# Patient Record
Sex: FEMALE | Race: WHITE | NOT HISPANIC OR LATINO
[De-identification: names, ages, dates, MRNs, and addresses within clinical notes are randomized per-mention and may not be internally consistent; named-entity substitution may affect disease eponyms.]

---

## 2018-08-02 PROBLEM — Z00.00 ENCOUNTER FOR PREVENTIVE HEALTH EXAMINATION: Status: ACTIVE | Noted: 2018-08-02

## 2018-08-06 ENCOUNTER — APPOINTMENT (OUTPATIENT)
Dept: THORACIC SURGERY | Facility: CLINIC | Age: 73
End: 2018-08-06
Payer: COMMERCIAL

## 2018-08-06 VITALS
RESPIRATION RATE: 16 BRPM | BODY MASS INDEX: 47.09 KG/M2 | HEIGHT: 66 IN | SYSTOLIC BLOOD PRESSURE: 133 MMHG | WEIGHT: 293 LBS | DIASTOLIC BLOOD PRESSURE: 74 MMHG | OXYGEN SATURATION: 97 % | HEART RATE: 90 BPM

## 2018-08-06 DIAGNOSIS — F17.200 NICOTINE DEPENDENCE, UNSPECIFIED, UNCOMPLICATED: ICD-10-CM

## 2018-08-06 DIAGNOSIS — Z86.39 PERSONAL HISTORY OF OTHER ENDOCRINE, NUTRITIONAL AND METABOLIC DISEASE: ICD-10-CM

## 2018-08-06 DIAGNOSIS — Z86.79 PERSONAL HISTORY OF OTHER DISEASES OF THE CIRCULATORY SYSTEM: ICD-10-CM

## 2018-08-06 DIAGNOSIS — Z87.09 PERSONAL HISTORY OF OTHER DISEASES OF THE RESPIRATORY SYSTEM: ICD-10-CM

## 2018-08-06 DIAGNOSIS — R91.8 OTHER NONSPECIFIC ABNORMAL FINDING OF LUNG FIELD: ICD-10-CM

## 2018-08-06 PROCEDURE — 99205 OFFICE O/P NEW HI 60 MIN: CPT

## 2018-08-06 RX ORDER — SITAGLIPTIN 100 MG/1
100 TABLET, FILM COATED ORAL
Refills: 0 | Status: ACTIVE | COMMUNITY

## 2018-08-06 RX ORDER — IRBESARTAN 150 MG/1
150 TABLET ORAL
Refills: 0 | Status: ACTIVE | COMMUNITY

## 2018-08-06 RX ORDER — RAMIPRIL 2.5 MG/1
2.5 CAPSULE ORAL
Refills: 0 | Status: ACTIVE | COMMUNITY

## 2018-08-06 RX ORDER — GLIMEPIRIDE 4 MG/1
4 TABLET ORAL
Refills: 0 | Status: ACTIVE | COMMUNITY

## 2018-08-06 RX ORDER — CARVEDILOL 3.12 MG/1
3.12 TABLET, FILM COATED ORAL
Refills: 0 | Status: ACTIVE | COMMUNITY

## 2018-10-11 ENCOUNTER — OUTPATIENT (OUTPATIENT)
Dept: OUTPATIENT SERVICES | Facility: HOSPITAL | Age: 73
LOS: 1 days | End: 2018-10-11
Payer: COMMERCIAL

## 2018-10-11 VITALS
TEMPERATURE: 99 F | DIASTOLIC BLOOD PRESSURE: 90 MMHG | HEART RATE: 88 BPM | SYSTOLIC BLOOD PRESSURE: 182 MMHG | HEIGHT: 68 IN | RESPIRATION RATE: 16 BRPM | WEIGHT: 231.49 LBS

## 2018-10-11 DIAGNOSIS — E11.9 TYPE 2 DIABETES MELLITUS WITHOUT COMPLICATIONS: ICD-10-CM

## 2018-10-11 DIAGNOSIS — Z01.818 ENCOUNTER FOR OTHER PREPROCEDURAL EXAMINATION: ICD-10-CM

## 2018-10-11 DIAGNOSIS — Z98.890 OTHER SPECIFIED POSTPROCEDURAL STATES: Chronic | ICD-10-CM

## 2018-10-11 DIAGNOSIS — Z96.653 PRESENCE OF ARTIFICIAL KNEE JOINT, BILATERAL: Chronic | ICD-10-CM

## 2018-10-11 DIAGNOSIS — R59.9 ENLARGED LYMPH NODES, UNSPECIFIED: ICD-10-CM

## 2018-10-11 DIAGNOSIS — Z29.9 ENCOUNTER FOR PROPHYLACTIC MEASURES, UNSPECIFIED: ICD-10-CM

## 2018-10-11 LAB
ANION GAP SERPL CALC-SCNC: 13 MMOL/L — SIGNIFICANT CHANGE UP (ref 5–17)
APTT BLD: 33.3 SEC — SIGNIFICANT CHANGE UP (ref 27.5–37.4)
BUN SERPL-MCNC: 13 MG/DL — SIGNIFICANT CHANGE UP (ref 8–20)
CALCIUM SERPL-MCNC: 9.9 MG/DL — SIGNIFICANT CHANGE UP (ref 8.6–10.2)
CHLORIDE SERPL-SCNC: 94 MMOL/L — LOW (ref 98–107)
CO2 SERPL-SCNC: 22 MMOL/L — SIGNIFICANT CHANGE UP (ref 22–29)
CREAT SERPL-MCNC: 0.64 MG/DL — SIGNIFICANT CHANGE UP (ref 0.5–1.3)
GLUCOSE SERPL-MCNC: 103 MG/DL — SIGNIFICANT CHANGE UP (ref 70–115)
HCT VFR BLD CALC: 40.1 % — SIGNIFICANT CHANGE UP (ref 37–47)
HGB BLD-MCNC: 13.3 G/DL — SIGNIFICANT CHANGE UP (ref 12–16)
INR BLD: 1.15 RATIO — SIGNIFICANT CHANGE UP (ref 0.88–1.16)
MCHC RBC-ENTMCNC: 30.1 PG — SIGNIFICANT CHANGE UP (ref 27–31)
MCHC RBC-ENTMCNC: 33.2 G/DL — SIGNIFICANT CHANGE UP (ref 32–36)
MCV RBC AUTO: 90.7 FL — SIGNIFICANT CHANGE UP (ref 81–99)
PLATELET # BLD AUTO: 209 K/UL — SIGNIFICANT CHANGE UP (ref 150–400)
POTASSIUM SERPL-MCNC: 4.5 MMOL/L — SIGNIFICANT CHANGE UP (ref 3.5–5.3)
POTASSIUM SERPL-SCNC: 4.5 MMOL/L — SIGNIFICANT CHANGE UP (ref 3.5–5.3)
PROTHROM AB SERPL-ACNC: 12.7 SEC — SIGNIFICANT CHANGE UP (ref 9.8–12.7)
RBC # BLD: 4.42 M/UL — SIGNIFICANT CHANGE UP (ref 4.4–5.2)
RBC # FLD: 13.9 % — SIGNIFICANT CHANGE UP (ref 11–15.6)
SODIUM SERPL-SCNC: 129 MMOL/L — LOW (ref 135–145)
WBC # BLD: 8 K/UL — SIGNIFICANT CHANGE UP (ref 4.8–10.8)
WBC # FLD AUTO: 8 K/UL — SIGNIFICANT CHANGE UP (ref 4.8–10.8)

## 2018-10-11 PROCEDURE — 36415 COLL VENOUS BLD VENIPUNCTURE: CPT

## 2018-10-11 PROCEDURE — 85610 PROTHROMBIN TIME: CPT

## 2018-10-11 PROCEDURE — 85730 THROMBOPLASTIN TIME PARTIAL: CPT

## 2018-10-11 PROCEDURE — G0463: CPT

## 2018-10-11 PROCEDURE — 85027 COMPLETE CBC AUTOMATED: CPT

## 2018-10-11 PROCEDURE — 80048 BASIC METABOLIC PNL TOTAL CA: CPT

## 2018-10-11 NOTE — H&P PST ADULT - NS NEC GEN PE MLT EXAM PC
I tentatively put her in the 5:20 because she should be seen. I am at [facility] today, not [place].  Could someone bring her? Perhaps a family member. It is not that bad out. Just a dusting of snow. detailed exam

## 2018-10-11 NOTE — H&P PST ADULT - PSH
H/O carpal tunnel repair  right  H/O foot surgery  with hard ware  H/O total knee replacement, bilateral    History of back surgery  with hard ware

## 2018-10-11 NOTE — H&P PST ADULT - HISTORY OF PRESENT ILLNESS
73 year old female 73 year old female who states that she noticed a mass on the left side of her neck 2 months ago, no complains of pain 73 year old female who states that she noticed a mass on the left side of her neck 2 months ago, no complains of pain now scheduled for a CT guided lymph node biopsy

## 2018-10-11 NOTE — H&P PST ADULT - ASSESSMENT
medications reviewed, instructions given on what medications to take and what not to take.   CAPRINI SCORE [CLOT]    AGE RELATED RISK FACTORS                                                       MOBILITY RELATED FACTORS  [ ] Age 41-60 years                                            (1 Point)                  [ ] Bed rest                                                        (1 Point)  [ ] Age: 61-74 years                                           (2 Points)                 [ ] Plaster cast                                                   (2 Points)  [ ] Age= 75 years                                              (3 Points)                 [ ] Bed bound for more than 72 hours                 (2 Points)    DISEASE RELATED RISK FACTORS                                               GENDER SPECIFIC FACTORS  [ ] Edema in the lower extremities                       (1 Point)                  [ ] Pregnancy                                                     (1 Point)  [ ] Varicose veins                                               (1 Point)                  [ ] Post-partum < 6 weeks                                   (1 Point)             [ ] BMI > 25 Kg/m2                                            (1 Point)                  [ ] Hormonal therapy  or oral contraception          (1 Point)                 [ ] Sepsis (in the previous month)                        (1 Point)                  [ ] History of pregnancy complications                 (1 point)  [ ] Pneumonia or serious lung disease                                               [ ] Unexplained or recurrent                     (1 Point)           (in the previous month)                               (1 Point)  [ ] Abnormal pulmonary function test                     (1 Point)                 SURGERY RELATED RISK FACTORS  [ ] Acute myocardial infarction                              (1 Point)                 [ ]  Section                                             (1 Point)  [ ] Congestive heart failure (in the previous month)  (1 Point)               [ ] Minor surgery                                                  (1 Point)   [ ] Inflammatory bowel disease                             (1 Point)                 [ ] Arthroscopic surgery                                        (2 Points)  [ ] Central venous access                                      (2 Points)                [ ] General surgery lasting more than 45 minutes   (2 Points)       [ ] Stroke (in the previous month)                          (5 Points)               [ ] Elective arthroplasty                                         (5 Points)                                                                                                                                               HEMATOLOGY RELATED FACTORS                                                 TRAUMA RELATED RISK FACTORS  [ ] Prior episodes of VTE                                     (3 Points)                 [ ] Fracture of the hip, pelvis, or leg                       (5 Points)  [ ] Positive family history for VTE                         (3 Points)                 [ ] Acute spinal cord injury (in the previous month)  (5 Points)  [ ] Prothrombin 74191 A                                     (3 Points)                 [ ] Paralysis  (less than 1 month)                             (5 Points)  [ ] Factor V Leiden                                             (3 Points)                  [ ] Multiple Trauma within 1 month                        (5 Points)  [ ] Lupus anticoagulants                                     (3 Points)                                                           [ ] Anticardiolipin antibodies                               (3 Points)                                                       [ ] High homocysteine in the blood                      (3 Points)                                             [ ] Other congenital or acquired thrombophilia      (3 Points)                                                [ ] Heparin induced thrombocytopenia                  (3 Points)                                          Total Score [          ] medications reviewed, instructions given on what medications to take and what not to take.   CAPRINI SCORE [CLOT]    AGE RELATED RISK FACTORS                                                       MOBILITY RELATED FACTORS  [ ] Age 41-60 years                                            (1 Point)                  [ ] Bed rest                                                        (1 Point)  [x ] Age: 61-74 years                                           (2 Points)                 [ ] Plaster cast                                                   (2 Points)  [ ] Age= 75 years                                              (3 Points)                 [ ] Bed bound for more than 72 hours                 (2 Points)    DISEASE RELATED RISK FACTORS                                               GENDER SPECIFIC FACTORS  [x ] Edema in the lower extremities                       (1 Point)                  [ ] Pregnancy                                                     (1 Point)  [ ] Varicose veins                                               (1 Point)                  [ ] Post-partum < 6 weeks                                   (1 Point)             x[ ] BMI > 25 Kg/m2                                            (1 Point)                  [ ] Hormonal therapy  or oral contraception          (1 Point)                 [ ] Sepsis (in the previous month)                        (1 Point)                  [ ] History of pregnancy complications                 (1 point)  [ ] Pneumonia or serious lung disease                                               [ ] Unexplained or recurrent                     (1 Point)           (in the previous month)                               (1 Point)  [ ] Abnormal pulmonary function test                     (1 Point)                 SURGERY RELATED RISK FACTORS  [ ] Acute myocardial infarction                              (1 Point)                 [ ]  Section                                             (1 Point)  [ ] Congestive heart failure (in the previous month)  (1 Point)               [x ] Minor surgery                                                  (1 Point)   [ ] Inflammatory bowel disease                             (1 Point)                 [ ] Arthroscopic surgery                                        (2 Points)  [ ] Central venous access                                      (2 Points)                [ ] General surgery lasting more than 45 minutes   (2 Points)       [ ] Stroke (in the previous month)                          (5 Points)               [ ] Elective arthroplasty                                         (5 Points)                                                                                                                                               HEMATOLOGY RELATED FACTORS                                                 TRAUMA RELATED RISK FACTORS  [ ] Prior episodes of VTE                                     (3 Points)                 [ ] Fracture of the hip, pelvis, or leg                       (5 Points)  [ ] Positive family history for VTE                         (3 Points)                 [ ] Acute spinal cord injury (in the previous month)  (5 Points)  [ ] Prothrombin 97025 A                                     (3 Points)                 [ ] Paralysis  (less than 1 month)                             (5 Points)  [ ] Factor V Leiden                                             (3 Points)                  [ ] Multiple Trauma within 1 month                        (5 Points)  [ ] Lupus anticoagulants                                     (3 Points)                                                           [ ] Anticardiolipin antibodies                               (3 Points)                                                       [ ] High homocysteine in the blood                      (3 Points)                                             [ ] Other congenital or acquired thrombophilia      (3 Points)                                                [ ] Heparin induced thrombocytopenia                  (3 Points)                                          Total Score [5          ]

## 2018-10-11 NOTE — H&P PST ADULT - NSANTHOSAYNRD_GEN_A_CORE
No. JULIANO screening performed.  STOP BANG Legend: 0-2 = LOW Risk; 3-4 = INTERMEDIATE Risk; 5-8 = HIGH Risk

## 2018-10-11 NOTE — H&P PST ADULT - SKIN COMMENTS
5/24/2017     RE: Eran Almeida  [address]  [facility] [address]     Dear Colleague,    Thank you for referring your patient, Eran Almeida, to the [facility] EAR NOSE AND THROAT at [facility]. Please see a copy of my visit note below.        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  He has been working with Shannan Francisco and has had significant improvement with the treatment with her.    He is quite pleased.  He has had some recovery of function.  The EMG demonstrates that he has multiple polyphasic action potentials, but does show moderate injury to the facial nerve.      On evaluation today, he has a facial examination that demonstrates a right-sided injury.  He has no brow elevation.  He has full eye closure, but in fact the palpebral aperture is more narrowed on the right compared to the left.  This is the affected side.  He has ocular-oral synkinesis.  When he speaks, he has significant tightening of the right eye.  When he tries to smile, he has complete closure of the right eye.  This synkinesis limits his view and function due to the visual limitation.  He has an eyelid weight in the right upper eyelid.  He has mentalis hypercontracture.  He has over activity of the left depressor.   He is also quite expressive with his brows, and the asymmetry is quite notable with hyperfunction of the left side.  Otherwise, head and neck examination is otherwise benign.           Procedure: Chemodenervation with Botulinum Toxin A  Indication: Hemifacial Spasm and Hypercontracture  Injector: Dr. Kaci Trevino    The skin was cleaned with antimicrobial solution and a topical ice mask was placed.   The patient was asked to systematically engage the muscles in the area to be injected. The tuberculin needles were used for subdermal  injection and hemostasis was obtained with digital pressure when needed. The skin was cleaned.     A total of 15 units was injected subdermally. Areas treated were : right periorbital region, right mentalis, left depressor, left brow  Please see scanned procedure log.    The patient tolerated the procedure well and there were no complications.      Omega presents today with recovery of the facial nerve from his injury; however, this has been delayed and incomplete.  All branches have been re-innervated.  Frontalis is not notable clinically, but is noted on EMG.  He, however, has now suffered the sequellae of recovery which include hemifacial spasm, tightness and synkinesis.  This is primarily notable with the closure of the eye when he moves his lower division of his face.  He would benefit from chemodenervation with botulinum toxin.  I discussed the risks and benefits of the procedure with him at length today.      He is interested in proceeding with the procedure and we performed the procedure today.  Please see the above procedure note.      The patient has discussed that Dr. Collins is considering removal of the platinum chain.  I actually think it will be beneficial to have the Botox on board and working to note what the true dependence is on that chain.   I would defer management of the platinum weights to Dr. Collins.     I spent a total of 30 minutes face-to-face with Eran Almeida during today's office visit.  Over 50% of this time was spent counseling the patient and/or coordinating care regarding his facial paralysis, reviewing the EMG, and performing the procedure.  See note for details.    Again, thank you for allowing me to participate in the care of your patient.      Sincerely,    Kaci Trevino MD     noted a intact dressing to left great toe/

## 2018-10-17 ENCOUNTER — INPATIENT (INPATIENT)
Facility: HOSPITAL | Age: 73
LOS: 0 days | Discharge: LEFT BEFORE DISCHARGE | DRG: 168 | End: 2018-10-18
Attending: FAMILY MEDICINE
Payer: COMMERCIAL

## 2018-10-17 VITALS
RESPIRATION RATE: 26 BRPM | OXYGEN SATURATION: 94 % | HEART RATE: 91 BPM | HEIGHT: 68 IN | WEIGHT: 233.03 LBS | DIASTOLIC BLOOD PRESSURE: 95 MMHG | SYSTOLIC BLOOD PRESSURE: 140 MMHG

## 2018-10-17 DIAGNOSIS — Z98.890 OTHER SPECIFIED POSTPROCEDURAL STATES: Chronic | ICD-10-CM

## 2018-10-17 DIAGNOSIS — E11.9 TYPE 2 DIABETES MELLITUS WITHOUT COMPLICATIONS: ICD-10-CM

## 2018-10-17 DIAGNOSIS — Z96.653 PRESENCE OF ARTIFICIAL KNEE JOINT, BILATERAL: Chronic | ICD-10-CM

## 2018-10-17 DIAGNOSIS — J18.9 PNEUMONIA, UNSPECIFIED ORGANISM: ICD-10-CM

## 2018-10-17 DIAGNOSIS — R05 COUGH: ICD-10-CM

## 2018-10-17 DIAGNOSIS — M54.9 DORSALGIA, UNSPECIFIED: ICD-10-CM

## 2018-10-17 DIAGNOSIS — I10 ESSENTIAL (PRIMARY) HYPERTENSION: ICD-10-CM

## 2018-10-17 LAB
ALBUMIN SERPL ELPH-MCNC: 4.5 G/DL — SIGNIFICANT CHANGE UP (ref 3.3–5.2)
ALP SERPL-CCNC: 66 U/L — SIGNIFICANT CHANGE UP (ref 40–120)
ALT FLD-CCNC: 11 U/L — SIGNIFICANT CHANGE UP
ANION GAP SERPL CALC-SCNC: 13 MMOL/L — SIGNIFICANT CHANGE UP (ref 5–17)
APTT BLD: 38.8 SEC — HIGH (ref 27.5–37.4)
AST SERPL-CCNC: 17 U/L — SIGNIFICANT CHANGE UP
BASOPHILS # BLD AUTO: 0 K/UL — SIGNIFICANT CHANGE UP (ref 0–0.2)
BASOPHILS NFR BLD AUTO: 0.6 % — SIGNIFICANT CHANGE UP (ref 0–2)
BILIRUB SERPL-MCNC: 0.4 MG/DL — SIGNIFICANT CHANGE UP (ref 0.4–2)
BUN SERPL-MCNC: 11 MG/DL — SIGNIFICANT CHANGE UP (ref 8–20)
CALCIUM SERPL-MCNC: 9.8 MG/DL — SIGNIFICANT CHANGE UP (ref 8.6–10.2)
CHLORIDE SERPL-SCNC: 97 MMOL/L — LOW (ref 98–107)
CO2 SERPL-SCNC: 23 MMOL/L — SIGNIFICANT CHANGE UP (ref 22–29)
CREAT SERPL-MCNC: 0.63 MG/DL — SIGNIFICANT CHANGE UP (ref 0.5–1.3)
EOSINOPHIL # BLD AUTO: 0.7 K/UL — HIGH (ref 0–0.5)
EOSINOPHIL NFR BLD AUTO: 8.6 % — HIGH (ref 0–6)
GLUCOSE SERPL-MCNC: 103 MG/DL — SIGNIFICANT CHANGE UP (ref 70–115)
HCT VFR BLD CALC: 40.2 % — SIGNIFICANT CHANGE UP (ref 37–47)
HGB BLD-MCNC: 13.1 G/DL — SIGNIFICANT CHANGE UP (ref 12–16)
INR BLD: 1.16 RATIO — SIGNIFICANT CHANGE UP (ref 0.88–1.16)
LYMPHOCYTES # BLD AUTO: 0.9 K/UL — LOW (ref 1–4.8)
LYMPHOCYTES # BLD AUTO: 11.2 % — LOW (ref 20–55)
MCHC RBC-ENTMCNC: 29.9 PG — SIGNIFICANT CHANGE UP (ref 27–31)
MCHC RBC-ENTMCNC: 32.6 G/DL — SIGNIFICANT CHANGE UP (ref 32–36)
MCV RBC AUTO: 91.8 FL — SIGNIFICANT CHANGE UP (ref 81–99)
MONOCYTES # BLD AUTO: 0.9 K/UL — HIGH (ref 0–0.8)
MONOCYTES NFR BLD AUTO: 11.9 % — HIGH (ref 3–10)
NEUTROPHILS # BLD AUTO: 5.3 K/UL — SIGNIFICANT CHANGE UP (ref 1.8–8)
NEUTROPHILS NFR BLD AUTO: 67.3 % — SIGNIFICANT CHANGE UP (ref 37–73)
NT-PROBNP SERPL-SCNC: 459 PG/ML — HIGH (ref 0–300)
PLATELET # BLD AUTO: 184 K/UL — SIGNIFICANT CHANGE UP (ref 150–400)
POTASSIUM SERPL-MCNC: 4.5 MMOL/L — SIGNIFICANT CHANGE UP (ref 3.5–5.3)
POTASSIUM SERPL-SCNC: 4.5 MMOL/L — SIGNIFICANT CHANGE UP (ref 3.5–5.3)
PROT SERPL-MCNC: 7.5 G/DL — SIGNIFICANT CHANGE UP (ref 6.6–8.7)
PROTHROM AB SERPL-ACNC: 12.8 SEC — HIGH (ref 9.8–12.7)
RAPID RVP RESULT: SIGNIFICANT CHANGE UP
RBC # BLD: 4.38 M/UL — LOW (ref 4.4–5.2)
RBC # FLD: 13.6 % — SIGNIFICANT CHANGE UP (ref 11–15.6)
SODIUM SERPL-SCNC: 133 MMOL/L — LOW (ref 135–145)
WBC # BLD: 7.8 K/UL — SIGNIFICANT CHANGE UP (ref 4.8–10.8)
WBC # FLD AUTO: 7.8 K/UL — SIGNIFICANT CHANGE UP (ref 4.8–10.8)

## 2018-10-17 PROCEDURE — 93010 ELECTROCARDIOGRAM REPORT: CPT

## 2018-10-17 PROCEDURE — 99223 1ST HOSP IP/OBS HIGH 75: CPT

## 2018-10-17 PROCEDURE — 99285 EMERGENCY DEPT VISIT HI MDM: CPT

## 2018-10-17 PROCEDURE — 71045 X-RAY EXAM CHEST 1 VIEW: CPT | Mod: 26

## 2018-10-17 RX ORDER — INSULIN LISPRO 100/ML
VIAL (ML) SUBCUTANEOUS
Qty: 0 | Refills: 0 | Status: DISCONTINUED | OUTPATIENT
Start: 2018-10-17 | End: 2018-10-18

## 2018-10-17 RX ORDER — ALBUTEROL 90 UG/1
2.5 AEROSOL, METERED ORAL EVERY 6 HOURS
Qty: 0 | Refills: 0 | Status: DISCONTINUED | OUTPATIENT
Start: 2018-10-17 | End: 2018-10-18

## 2018-10-17 RX ORDER — DEXTROSE 50 % IN WATER 50 %
25 SYRINGE (ML) INTRAVENOUS ONCE
Qty: 0 | Refills: 0 | Status: DISCONTINUED | OUTPATIENT
Start: 2018-10-17 | End: 2018-10-18

## 2018-10-17 RX ORDER — CARVEDILOL PHOSPHATE 80 MG/1
3.12 CAPSULE, EXTENDED RELEASE ORAL EVERY 12 HOURS
Qty: 0 | Refills: 0 | Status: DISCONTINUED | OUTPATIENT
Start: 2018-10-17 | End: 2018-10-18

## 2018-10-17 RX ORDER — GLUCAGON INJECTION, SOLUTION 0.5 MG/.1ML
1 INJECTION, SOLUTION SUBCUTANEOUS ONCE
Qty: 0 | Refills: 0 | Status: DISCONTINUED | OUTPATIENT
Start: 2018-10-17 | End: 2018-10-18

## 2018-10-17 RX ORDER — ALBUTEROL 90 UG/1
2.5 AEROSOL, METERED ORAL
Qty: 0 | Refills: 0 | Status: COMPLETED | OUTPATIENT
Start: 2018-10-17 | End: 2018-10-17

## 2018-10-17 RX ORDER — DEXTROSE 50 % IN WATER 50 %
15 SYRINGE (ML) INTRAVENOUS ONCE
Qty: 0 | Refills: 0 | Status: DISCONTINUED | OUTPATIENT
Start: 2018-10-17 | End: 2018-10-18

## 2018-10-17 RX ORDER — LISINOPRIL 2.5 MG/1
10 TABLET ORAL DAILY
Qty: 0 | Refills: 0 | Status: DISCONTINUED | OUTPATIENT
Start: 2018-10-17 | End: 2018-10-18

## 2018-10-17 RX ORDER — HEPARIN SODIUM 5000 [USP'U]/ML
5000 INJECTION INTRAVENOUS; SUBCUTANEOUS EVERY 12 HOURS
Qty: 0 | Refills: 0 | Status: DISCONTINUED | OUTPATIENT
Start: 2018-10-17 | End: 2018-10-18

## 2018-10-17 RX ORDER — CYCLOBENZAPRINE HYDROCHLORIDE 10 MG/1
10 TABLET, FILM COATED ORAL THREE TIMES A DAY
Qty: 0 | Refills: 0 | Status: DISCONTINUED | OUTPATIENT
Start: 2018-10-17 | End: 2018-10-18

## 2018-10-17 RX ORDER — CEFTRIAXONE 500 MG/1
1 INJECTION, POWDER, FOR SOLUTION INTRAMUSCULAR; INTRAVENOUS ONCE
Qty: 0 | Refills: 0 | Status: COMPLETED | OUTPATIENT
Start: 2018-10-17 | End: 2018-10-17

## 2018-10-17 RX ORDER — SODIUM CHLORIDE 9 MG/ML
1000 INJECTION, SOLUTION INTRAVENOUS
Qty: 0 | Refills: 0 | Status: DISCONTINUED | OUTPATIENT
Start: 2018-10-17 | End: 2018-10-18

## 2018-10-17 RX ORDER — IRBESARTAN 75 MG/1
1 TABLET ORAL
Qty: 0 | Refills: 0 | COMMUNITY

## 2018-10-17 RX ORDER — OXYCODONE AND ACETAMINOPHEN 5; 325 MG/1; MG/1
1 TABLET ORAL EVERY 6 HOURS
Qty: 0 | Refills: 0 | Status: DISCONTINUED | OUTPATIENT
Start: 2018-10-17 | End: 2018-10-18

## 2018-10-17 RX ORDER — IPRATROPIUM/ALBUTEROL SULFATE 18-103MCG
3 AEROSOL WITH ADAPTER (GRAM) INHALATION
Qty: 0 | Refills: 0 | Status: COMPLETED | OUTPATIENT
Start: 2018-10-17 | End: 2018-10-17

## 2018-10-17 RX ORDER — CYCLOBENZAPRINE HYDROCHLORIDE 10 MG/1
1 TABLET, FILM COATED ORAL
Qty: 0 | Refills: 0 | COMMUNITY

## 2018-10-17 RX ORDER — LOSARTAN POTASSIUM 100 MG/1
50 TABLET, FILM COATED ORAL DAILY
Qty: 0 | Refills: 0 | Status: DISCONTINUED | OUTPATIENT
Start: 2018-10-17 | End: 2018-10-18

## 2018-10-17 RX ORDER — RAMIPRIL 5 MG
1 CAPSULE ORAL
Qty: 0 | Refills: 0 | COMMUNITY

## 2018-10-17 RX ORDER — AZITHROMYCIN 500 MG/1
500 TABLET, FILM COATED ORAL ONCE
Qty: 0 | Refills: 0 | Status: COMPLETED | OUTPATIENT
Start: 2018-10-17 | End: 2018-10-17

## 2018-10-17 RX ORDER — DEXTROSE 50 % IN WATER 50 %
12.5 SYRINGE (ML) INTRAVENOUS ONCE
Qty: 0 | Refills: 0 | Status: DISCONTINUED | OUTPATIENT
Start: 2018-10-17 | End: 2018-10-18

## 2018-10-17 RX ORDER — GLIMEPIRIDE 1 MG
1 TABLET ORAL
Qty: 0 | Refills: 0 | COMMUNITY

## 2018-10-17 RX ORDER — CARVEDILOL PHOSPHATE 80 MG/1
1 CAPSULE, EXTENDED RELEASE ORAL
Qty: 0 | Refills: 0 | COMMUNITY

## 2018-10-17 RX ADMIN — Medication 3 MILLILITER(S): at 16:51

## 2018-10-17 RX ADMIN — ALBUTEROL 2.5 MILLIGRAM(S): 90 AEROSOL, METERED ORAL at 20:00

## 2018-10-17 RX ADMIN — Medication 125 MILLIGRAM(S): at 17:22

## 2018-10-17 RX ADMIN — CEFTRIAXONE 100 GRAM(S): 500 INJECTION, POWDER, FOR SOLUTION INTRAMUSCULAR; INTRAVENOUS at 17:22

## 2018-10-17 RX ADMIN — ALBUTEROL 2.5 MILLIGRAM(S): 90 AEROSOL, METERED ORAL at 03:32

## 2018-10-17 RX ADMIN — ALBUTEROL 2.5 MILLIGRAM(S): 90 AEROSOL, METERED ORAL at 17:52

## 2018-10-17 RX ADMIN — CEFTRIAXONE 1 GRAM(S): 500 INJECTION, POWDER, FOR SOLUTION INTRAMUSCULAR; INTRAVENOUS at 18:00

## 2018-10-17 RX ADMIN — AZITHROMYCIN 255 MILLIGRAM(S): 500 TABLET, FILM COATED ORAL at 19:11

## 2018-10-17 RX ADMIN — Medication 3 MILLILITER(S): at 17:12

## 2018-10-17 RX ADMIN — Medication 3 MILLILITER(S): at 17:52

## 2018-10-17 NOTE — ED PROVIDER NOTE - MEDICAL DECISION MAKING DETAILS
The patient came from IR for cough and respiratory distress unable to perform biopsy.  Will admit for further evaluation

## 2018-10-17 NOTE — ED ADULT NURSE NOTE - NSIMPLEMENTINTERV_GEN_ALL_ED
Implemented All Universal Safety Interventions:  [place] to call system. Call bell, personal items and telephone within reach. Instruct patient to call for assistance. Room bathroom lighting operational. Non-slip footwear when patient is off stretcher. Physically safe environment: no spills, clutter or unnecessary equipment. Stretcher in lowest position, wheels locked, appropriate side rails in place.

## 2018-10-17 NOTE — H&P ADULT - PROBLEM SELECTOR PLAN 3
controlled.  on ramipril 2.5 mg -->lisinopril 10 mg, Irbesartan 150 mg-->cozaar 50.  continue coreg 12.5 mg bid.

## 2018-10-17 NOTE — H&P ADULT - PROBLEM SELECTOR PLAN 1
?etiology.  CT chest done at an outside facility concerning for lung CA  s/p unsuccessful attempt to biopsy L neck mass.  patient has been following with CT chest surgery and has been notified by ER.  will obtain pulmonary consult.

## 2018-10-17 NOTE — H&P ADULT - ASSESSMENT
73 years old female with PMH of HTN, DM and chronic back pain came to ER fro the evaluation of cough and SOB s/p an attempt to biopsy L neck mass by IR. Reportedly patient has been having 2 months h/o cough worsening over the past several days.  She had a chest CT last month which was notable for areas of opacity suspicious for lung CA. She was also noted to have  a left neck lump around . She was seen by CT chest and scheduled for L neck mass biopsy today, Procedure  was aborted due to the patient's excessive bouts of coughing.  Positive history of smoking (1 pack a day) and a recent unintentional weight loss of about 26lbs over the last 2 months.  Patient also reports headache, SOB and a sensation of "something stuck in my throat".

## 2018-10-17 NOTE — ED PROVIDER NOTE - OBJECTIVE STATEMENT
Patient is a 73y F with PMHx of diabetes mellitus presenting to ED with complaint of cough.  Patient says she developed a cough roughly 2-3 month ago that has become increasingly worse and more productive over the last month.  The worsening cough was evaluated with chest CT last month which was notable for areas of opacity suspicious for lung CA.  The patient also discovered a left neck lump around this time.  She was subsequently scheduled to received a lymph node biopsy today, but it was aborted due to the patient's excessive bouts of coughing.  Patient confirms a long history of smoking (1 pack a day) and a recent unintentional weight loss of about 26lbs over the last 2 months.  She also endorses night sweats, SOB, headaches, and a sensation of "something stuck in my throat".

## 2018-10-17 NOTE — ED ADULT TRIAGE NOTE - CHIEF COMPLAINT QUOTE
Pt presents to ED for eval of excessive bouts of coughing and phlegm x2-3 months. Pt also c/o weight loss of approx 26 pounds in the past 3 months unintentionally. Pt brought from IR after aborting biopsy procedure.

## 2018-10-18 VITALS
HEART RATE: 93 BPM | TEMPERATURE: 98 F | RESPIRATION RATE: 20 BRPM | OXYGEN SATURATION: 96 % | DIASTOLIC BLOOD PRESSURE: 68 MMHG | SYSTOLIC BLOOD PRESSURE: 168 MMHG

## 2018-10-18 DIAGNOSIS — F54 PSYCHOLOGICAL AND BEHAVIORAL FACTORS ASSOCIATED WITH DISORDERS OR DISEASES CLASSIFIED ELSEWHERE: ICD-10-CM

## 2018-10-18 LAB
GLUCOSE BLDC GLUCOMTR-MCNC: 118 MG/DL — HIGH (ref 70–99)
GLUCOSE BLDC GLUCOMTR-MCNC: 130 MG/DL — HIGH (ref 70–99)
GLUCOSE BLDC GLUCOMTR-MCNC: 142 MG/DL — HIGH (ref 70–99)
HBA1C BLD-MCNC: 6.6 % — HIGH (ref 4–5.6)
PROCALCITONIN SERPL-MCNC: <0.05 NG/ML — HIGH (ref 0–0.04)

## 2018-10-18 PROCEDURE — 99221 1ST HOSP IP/OBS SF/LOW 40: CPT

## 2018-10-18 PROCEDURE — 73630 X-RAY EXAM OF FOOT: CPT | Mod: 26,LT

## 2018-10-18 PROCEDURE — 99233 SBSQ HOSP IP/OBS HIGH 50: CPT | Mod: GC

## 2018-10-18 PROCEDURE — 71260 CT THORAX DX C+: CPT | Mod: 26

## 2018-10-18 PROCEDURE — 93970 EXTREMITY STUDY: CPT | Mod: 26

## 2018-10-18 RX ORDER — CYCLOBENZAPRINE HYDROCHLORIDE 10 MG/1
10 TABLET, FILM COATED ORAL THREE TIMES A DAY
Qty: 0 | Refills: 0 | Status: DISCONTINUED | OUTPATIENT
Start: 2018-10-18 | End: 2018-10-18

## 2018-10-18 RX ADMIN — LOSARTAN POTASSIUM 50 MILLIGRAM(S): 100 TABLET, FILM COATED ORAL at 05:07

## 2018-10-18 RX ADMIN — LISINOPRIL 10 MILLIGRAM(S): 2.5 TABLET ORAL at 05:07

## 2018-10-18 RX ADMIN — HEPARIN SODIUM 5000 UNIT(S): 5000 INJECTION INTRAVENOUS; SUBCUTANEOUS at 05:07

## 2018-10-18 RX ADMIN — CARVEDILOL PHOSPHATE 3.12 MILLIGRAM(S): 80 CAPSULE, EXTENDED RELEASE ORAL at 05:07

## 2018-10-18 NOTE — BEHAVIORAL HEALTH ASSESSMENT NOTE - NSBHCHARTREVIEWINVESTIGATE_PSY_A_CORE FT
< from: 12 Lead ECG (10.17.18 @ 19:25) >      Diagnosis Line Normal sinus rhythm  Low voltage QRS  Cannot rule out Anteroseptal infarct , age undetermined  Abnormal ECG    < end of copied text >

## 2018-10-18 NOTE — BEHAVIORAL HEALTH ASSESSMENT NOTE - NSBHCHARTREVIEWLAB_PSY_A_CORE FT
13.1   7.8   )-----------( 184      ( 17 Oct 2018 16:28 )             40.2     10-17    133<L>  |  97<L>  |  11.0  ----------------------------<  103  4.5   |  23.0  |  0.63    Ca    9.8      17 Oct 2018 16:28    TPro  7.5  /  Alb  4.5  /  TBili  0.4  /  DBili  x   /  AST  17  /  ALT  11  /  AlkPhos  66  10-17    LIVER FUNCTIONS - ( 17 Oct 2018 16:28 )  Alb: 4.5 g/dL / Pro: 7.5 g/dL / ALK PHOS: 66 U/L / ALT: 11 U/L / AST: 17 U/L / GGT: x           PT/INR - ( 17 Oct 2018 16:28 )   PT: 12.8 sec;   INR: 1.16 ratio         PTT - ( 17 Oct 2018 16:28 )  PTT:38.8 sec

## 2018-10-18 NOTE — ED ADULT NURSE REASSESSMENT NOTE - NS ED NURSE REASSESS COMMENT FT1
Pt has been taking her Norco that she has with her from home without telling hospital staff.  Pt was told she cannot continue taking her own medication while in hospital.  Pt refuses to stop taking her own meds and states she was to leave the hospital if she cannot take her own meds.  Message left on voicemail to Dr. Perez and made aware of situation.  No pain mediations given by me.
Pt refusing to take her coreg and heparin injection.
Pt reports taking hydrocodone and Flexeril from home meds.
Report given at change of shift to receiving RN David Hill and pt endorsed to same for follow up and continuity of care.
patient requesting ama  admit team contacted iv access removed  by ed attending risk and benefits  explained by md ibrahim , pt refused to sign ama paperwork
Pt. is complaining of pain in feet and buttocks (9/10) however is refusing both Flexeril and Percocet prescribed PRN pain and muscle spasms. Pt. was educated on medications given this AM (which did not include either pain medication) however patient is continuing to refuse medications.
Pt A&OX3, ambulating throughout unit, pt not spending anytime on the CM, pt told the importance to stay on monitor, pt states she cannot stay in bed.  Pt had told Dr. Perez she would give us the Norco she has at bedside and have pharmacy dispense her own medication.  Pt has now refused to hand over the medication.  Pt states she is not and has not taken medication today.

## 2018-10-18 NOTE — BEHAVIORAL HEALTH ASSESSMENT NOTE - HPI (INCLUDE ILLNESS QUALITY, SEVERITY, DURATION, TIMING, CONTEXT, MODIFYING FACTORS, ASSOCIATED SIGNS AND SYMPTOMS)
72 yo female presented to ED with chronic cough. Pt had a previous workup for which a suspicious lesion was found. Biopsy was needed but was never done. Pt states she is willing to have the biopsy done in order to figure out what is causing her cough. Pt denies any past psych hx. Denies any feelings of depression.  Pt states she is agitated and anxious because she is not receiving her pain medications for her chronic back pain but otherwise reports feeling fine.

## 2018-10-18 NOTE — CONSULT NOTE ADULT - ASSESSMENT
Patient with advanced lung cancer but has not had confirmatory biopsy and obviously no treatment.  Her more pressing issue at this time is her clearly psychiatric issues preventing evaluation.      Plan:  1.Psych to see re: competency to make decisions  2.If agrees to stay would get CT of chest with contrast  3.Needs biopsy>likely can be done via palpable left supraclavicular mass once confirmed by CT.   4.Will followup if stays. Regardless, prognosis poor and would get early palliative intervention.

## 2018-10-18 NOTE — BEHAVIORAL HEALTH ASSESSMENT NOTE - SUMMARY
72 yo female with a h/o chronic cough previously evaluated in the ED. Upon Xray, suspicious lesion was found. Further workup including biopsy was recommended. Pt did not follow up to have the biopsy performed. Pt returned to ED d/t cough. Pt understands the need for biopsy and agrees to have the procedure done. Pt states she is agitated and anxious because she is not receiving her pain medications for her chronic back pain.  Patient has reasonable capacity to make decisions about her care Basically understands proposed course of treatment/ needed exams and is aware of possible cancer diagnosis.

## 2018-10-18 NOTE — CONSULT NOTE ADULT - SUBJECTIVE AND OBJECTIVE BOX
S : 73y year old Female seen at left hallux ulceration. Patient states she has had the ulcer for a couple of months. States her big toe has started undergoing a hammering deformity and has caused breakdown of skin at the tip of it. Patient cleanses the ulceration site daily and applies neosporin to the site. Patient states she used to see a podiatrist but that podiatrist has moved away. Pt also states she has had reconstructive foot surgery done to L foot years ago and since then has noticed hammering hallux deformity. Denies any pain to L toe/denies malodor.     Chief Complaint : Patient is a 73y old  Female who presents with a chief complaint of cough (18 Oct 2018 10:25)    HPI : HPI:  73 years old female with PMH of HTN, DM and chronic back pain came to ER fro the evaluation of cough and SOB s/p an attempt to biopsy L neck mass by IR. Reportedly patient has been having 2 months h/o cough worsening over the past several days.  She had a chest CT last month which was notable for areas of opacity suspicious for lung CA. She was also noted to have  a left neck lump around . She was seen by CT chest and scheduled for L neck mass biopsy today, Procedure  was aborted due to the patient's excessive bouts of coughing.  Positive history of smoking (1 pack a day) and a recent unintentional weight loss of about 26lbs over the last 2 months.  Patient also reports headache, SOB and a sensation of "something stuck in my throat". (17 Oct 2018 18:50)      Patient admits to  (-) Fevers, (-) Chills, (-) Nausea, (-) Vomiting, (-) Shortness of Breath      PMH: Diabetes mellitus    PSH:History of back surgery  H/O carpal tunnel repair  H/O foot surgery  H/O total knee replacement, bilateral      Allergies:No Known Allergies      Labs:                          13.1   7.8   )-----------( 184      ( 17 Oct 2018 16:28 )             40.2     WBC Trend  7.8 Date (10-17 @ 16:28)  8.0 Date (10-11 @ 13:01)      Chem  10-17    133<L>  |  97<L>  |  11.0  ----------------------------<  103  4.5   |  23.0  |  0.63    Ca    9.8      17 Oct 2018 16:28    TPro  7.5  /  Alb  4.5  /  TBili  0.4  /  DBili  x   /  AST  17  /  ALT  11  /  AlkPhos  66  10-17          T(F): 98.3 (10-18-18 @ 02:35), Max: 98.6 (10-17-18 @ 18:50)  HR: 73 (10-18-18 @ 09:03) (18 - 97)  BP: 168/70 (10-18-18 @ 09:03) (140/95 - 195/77)  RR: 18 (10-18-18 @ 09:03) (18 - 26)  SpO2: 93% (10-18-18 @ 09:03) (93% - 95%)  Wt(kg): --    O:   General: Pleasant  female NAD & AOX3.    Integument:  Skin warm, dry and supple bilateral.    Ulceration Left Plantar hallux:  fibrogranular in nature, + hyperkeratotic border, wound base Fibrogranular, wound size 1.5 x 1.0 x 0.2 cm - edema, - renae-wound erythema, - purulence, - fluctuance, - tracking/tunneling, - probe to bone.   Vascular: Dorsalis Pedis and Posterior Tibial pulses 2/4.  Capillary re-fill time less then 3 seconds digits 1-5 bilateral.    Neuro: Protective sensation intact to the level of the digits bilateral.  MSK: Muscle strength 5/5 all major muscle groups bilateral. Hammered hallux IPJ L foot     Deformity:  A: L hallux plantar ulceration     P:   Chart reviewed and Patient evaluated  Discussed diagnosis and treatment with patient  Wound flush with normal saline  Excisional debridement with 15 blade of L hallux base down to subcutaneous tissue Left foot ulceration  Applied neosporin and dry sterile dressing  X-rays ordered   Weight bearing as tolerated L foot   Patient will benefit from local wound care and possible surgical intervention to remove hallux hammertoe deformity. Patient can get elective surgery once ulceration is healed to prevent further breakdown to hallux skin.   Wound Care Instruction for L hallux: 1. Cleanse with sterile saline 2. Apply medihoney to ulceration site 3. Apply 4x4 guaze and kerlix 4. Secure with olive   Offloading to bilateral Heels.   Discussed importance of daily foot examinations and proper shoe gear and to importance of lower Fasting Blood Glucose levels.   Podiatry stable   Discussed with Dr. Garza

## 2018-10-18 NOTE — ED ADULT NURSE REASSESSMENT NOTE - GENERAL PATIENT STATE
comfortable appearance
anxious
comfortable appearance
smiling/interactive/comfortable appearance/cooperative

## 2018-10-18 NOTE — PROGRESS NOTE ADULT - SUBJECTIVE AND OBJECTIVE BOX
CC: cough (18 Oct 2018 10:01)    HPI: 73 years old female with PMH of HTN, DM and chronic back pain came to ER fro the evaluation of cough and SOB s/p an attempt to biopsy L neck mass by IR. Reportedly patient has been having 2 months h/o cough worsening over the past several days.  She had a chest CT last month which was notable for areas of opacity suspicious for lung CA. She was also noted to have  a left neck lump around . She was seen by CT chest and scheduled for L neck mass biopsy today, Procedure  was aborted due to the patient's excessive bouts of coughing.  Positive history of smoking (1 pack a day) and a recent unintentional weight loss of about 26lbs over the last 2 months.  Patient also reports headache, SOB and a sensation of "something stuck in my throat". (17 Oct 2018 18:50)    INTERVAL HPI/OVERNIGHT EVENTS: Pt is very unhappy, was taking her home meds, c/o body aches, BL LE pain with paresthesias, episodes of cough relieved by cold water, very tearful, periodically wants to leave AMA, obese  Other ROS reviewed and neg     Vital Signs Last 24 Hrs  T(C): 36.8 (18 Oct 2018 02:35), Max: 37 (17 Oct 2018 18:50)  T(F): 98.3 (18 Oct 2018 02:35), Max: 98.6 (17 Oct 2018 18:50)  HR: 73 (18 Oct 2018 09:03) (18 - 97)  BP: 168/70 (18 Oct 2018 09:03) (140/95 - 195/77)  BP(mean): 110 (17 Oct 2018 18:50) (110 - 110)  RR: 18 (18 Oct 2018 09:03) (18 - 26)  SpO2: 93% (18 Oct 2018 09:03) (93% - 97%)                       13.1   7.8   )-----------( 184      ( 17 Oct 2018 16:28 )             40.2     17 Oct 2018 16:28    133    |  97     |  11.0   ----------------------------<  103    4.5     |  23.0   |  0.63     Ca    9.8        17 Oct 2018 16:28    TPro  7.5    /  Alb  4.5    /  TBili  0.4    /  DBili  x      /  AST  17     /  ALT  11     /  AlkPhos  66     17 Oct 2018 16:28    PT/INR - ( 17 Oct 2018 16:28 )   PT: 12.8 sec;   INR: 1.16 ratio      PTT - ( 17 Oct 2018 16:28 )  PTT:38.8 sec    POCT Blood Glucose.: 130 mg/dL (18 Oct 2018 09:05)  POCT Blood Glucose.: 97 mg/dL (17 Oct 2018 12:17)    LIVER FUNCTIONS - ( 17 Oct 2018 16:28 )  Alb: 4.5 g/dL / Pro: 7.5 g/dL / ALK PHOS: 66 U/L / ALT: 11 U/L / AST: 17 U/L / GGT: x           Hemoglobin A1C, Whole Blood: 6.6 % (10-18-18 @ 08:00)    MEDICATIONS  (STANDING):  carvedilol 3.125 milliGRAM(s) Oral every 12 hours  dextrose 5%. 1000 milliLiter(s) (50 mL/Hr) IV Continuous <Continuous>  dextrose 50% Injectable 12.5 Gram(s) IV Push once  dextrose 50% Injectable 25 Gram(s) IV Push once  dextrose 50% Injectable 25 Gram(s) IV Push once  heparin  Injectable 5000 Unit(s) SubCutaneous every 12 hours  insulin lispro (HumaLOG) corrective regimen sliding scale   SubCutaneous three times a day before meals  lisinopril 10 milliGRAM(s) Oral daily  losartan 50 milliGRAM(s) Oral daily    MEDICATIONS  (PRN):  ALBUTerol    0.083% 2.5 milliGRAM(s) Nebulizer every 6 hours PRN Shortness of Breath and/or Wheezing  cyclobenzaprine 10 milliGRAM(s) Oral three times a day PRN Muscle Spasm  dextrose 40% Gel 15 Gram(s) Oral once PRN Blood Glucose LESS THAN 70 milliGRAM(s)/deciliter  glucagon  Injectable 1 milliGRAM(s) IntraMuscular once PRN Glucose LESS THAN 70 milligrams/deciliter  oxyCODONE    5 mG/acetaminophen 325 mG 1 Tablet(s) Oral every 6 hours PRN Severe Pain (7 - 10)      RADIOLOGY & ADDITIONAL TESTS: CC: cough (18 Oct 2018 10:01)    HPI: 73 years old female with PMH of HTN, DM and chronic back pain came to ER fro the evaluation of cough and SOB s/p an attempt to biopsy L neck mass by IR. Reportedly patient has been having 2 months h/o cough worsening over the past several days.  She had a chest CT last month which was notable for areas of opacity suspicious for lung CA. She was also noted to have  a left neck lump around . She was seen by CT chest and scheduled for L neck mass biopsy today, Procedure  was aborted due to the patient's excessive bouts of coughing.  Positive history of smoking (1 pack a day) and a recent unintentional weight loss of about 26lbs over the last 2 months.  Patient also reports headache, SOB and a sensation of "something stuck in my throat". (17 Oct 2018 18:50)    INTERVAL HPI/OVERNIGHT EVENTS: Pt is very unhappy, was taking her home meds, c/o body aches, BL LE pain with paresthesias, episodes of cough relieved by cold water, very tearful, periodically wants to leave AMA, obese  Other ROS reviewed and neg     Vital Signs Last 24 Hrs  T(C): 36.8 (18 Oct 2018 02:35), Max: 37 (17 Oct 2018 18:50)  T(F): 98.3 (18 Oct 2018 02:35), Max: 98.6 (17 Oct 2018 18:50)  HR: 73 (18 Oct 2018 09:03) (18 - 97)  BP: 168/70 (18 Oct 2018 09:03) (140/95 - 195/77)  BP(mean): 110 (17 Oct 2018 18:50) (110 - 110)  RR: 18 (18 Oct 2018 09:03) (18 - 26)  SpO2: 93% (18 Oct 2018 09:03) (93% - 97%)                       13.1   7.8   )-----------( 184      ( 17 Oct 2018 16:28 )             40.2     17 Oct 2018 16:28    133    |  97     |  11.0   ----------------------------<  103    4.5     |  23.0   |  0.63     Ca    9.8        17 Oct 2018 16:28    TPro  7.5    /  Alb  4.5    /  TBili  0.4    /  DBili  x      /  AST  17     /  ALT  11     /  AlkPhos  66     17 Oct 2018 16:28    PT/INR - ( 17 Oct 2018 16:28 )   PT: 12.8 sec;   INR: 1.16 ratio      PTT - ( 17 Oct 2018 16:28 )  PTT:38.8 sec    POCT Blood Glucose.: 130 mg/dL (18 Oct 2018 09:05)  POCT Blood Glucose.: 97 mg/dL (17 Oct 2018 12:17)    LIVER FUNCTIONS - ( 17 Oct 2018 16:28 )  Alb: 4.5 g/dL / Pro: 7.5 g/dL / ALK PHOS: 66 U/L / ALT: 11 U/L / AST: 17 U/L / GGT: x           Hemoglobin A1C, Whole Blood: 6.6 % (10-18-18 @ 08:00)    MEDICATIONS  (STANDING):  carvedilol 3.125 milliGRAM(s) Oral every 12 hours  dextrose 5%. 1000 milliLiter(s) (50 mL/Hr) IV Continuous <Continuous>  dextrose 50% Injectable 12.5 Gram(s) IV Push once  dextrose 50% Injectable 25 Gram(s) IV Push once  dextrose 50% Injectable 25 Gram(s) IV Push once  heparin  Injectable 5000 Unit(s) SubCutaneous every 12 hours  insulin lispro (HumaLOG) corrective regimen sliding scale   SubCutaneous three times a day before meals  lisinopril 10 milliGRAM(s) Oral daily  losartan 50 milliGRAM(s) Oral daily    MEDICATIONS  (PRN):  ALBUTerol    0.083% 2.5 milliGRAM(s) Nebulizer every 6 hours PRN Shortness of Breath and/or Wheezing  cyclobenzaprine 10 milliGRAM(s) Oral three times a day PRN Muscle Spasm  dextrose 40% Gel 15 Gram(s) Oral once PRN Blood Glucose LESS THAN 70 milliGRAM(s)/deciliter  glucagon  Injectable 1 milliGRAM(s) IntraMuscular once PRN Glucose LESS THAN 70 milligrams/deciliter  oxyCODONE    5 mG/acetaminophen 325 mG 1 Tablet(s) Oral every 6 hours PRN Severe Pain (7 - 10)    RADIOLOGY & ADDITIONAL TESTS: personally visualized    PHYSICAL EXAM:    General: obese female in no acute distress  Eyes: PERRLA, EOMI; conjunctiva and sclera clear  Head: Normocephalic; atraumatic  ENMT: No nasal discharge; airway clear  Neck: Supple; non tender; no masses  Respiratory: decreased BS at bases, R>L  Cardiovascular: S1 and S2, regular  Gastrointestinal: Soft abd, NT, + BS  Genitourinary: No costovertebral angle tenderness  Extremities: No clubbing, cyanosis, + BL LE edema L>R with venous stasis dermatosis changes, decreased ROM in BL knees s/p BL TKA  Vascular: Peripheral pulses palpable 2+ bilaterally  Neurological: Alert and oriented x4  Skin: Warm and dry.   Lymph Nodes: + left cervical/axillary adenopathy  Musculoskeletal: Normal tone, without deformities  Psychiatric: Cooperative, tearfull

## 2018-10-18 NOTE — CONSULT NOTE ADULT - SUBJECTIVE AND OBJECTIVE BOX
PULMONARY CONSULT NOTE      ISRAEL HUGHES-[id number]    Patient is a 73y old  Female who presents with a chief complaint of cough (17 Oct 2018 18:50)  Patient is a poor historian, angry, somewhat belligerent who was seen in August by thoracic surgery for findings of lung mass with evidence of spread lymph nodes.  Biopsy was to be set up but patient refused.  She states that they couldn't do it because "they were afraid they were going to cut my neck".  She still smokes.  She denies hemoptysis.  There is c/o pain but site is vague.  She is not cooperative for exam continuing to search through her belongings while trying to examine her.      INTERVAL HPI/OVERNIGHT EVENTS:    MEDICATIONS  (STANDING):  carvedilol 3.125 milliGRAM(s) Oral every 12 hours  dextrose 5%. 1000 milliLiter(s) (50 mL/Hr) IV Continuous <Continuous>  dextrose 50% Injectable 12.5 Gram(s) IV Push once  dextrose 50% Injectable 25 Gram(s) IV Push once  dextrose 50% Injectable 25 Gram(s) IV Push once  heparin  Injectable 5000 Unit(s) SubCutaneous every 12 hours  insulin lispro (HumaLOG) corrective regimen sliding scale   SubCutaneous three times a day before meals  lisinopril 10 milliGRAM(s) Oral daily  losartan 50 milliGRAM(s) Oral daily      MEDICATIONS  (PRN):  ALBUTerol    0.083% 2.5 milliGRAM(s) Nebulizer every 6 hours PRN Shortness of Breath and/or Wheezing  cyclobenzaprine 10 milliGRAM(s) Oral three times a day PRN Muscle Spasm  dextrose 40% Gel 15 Gram(s) Oral once PRN Blood Glucose LESS THAN 70 milliGRAM(s)/deciliter  glucagon  Injectable 1 milliGRAM(s) IntraMuscular once PRN Glucose LESS THAN 70 milligrams/deciliter  oxyCODONE    5 mG/acetaminophen 325 mG 1 Tablet(s) Oral every 6 hours PRN Severe Pain (7 - 10)      Allergies    No Known Allergies    Intolerances        PAST MEDICAL & SURGICAL HISTORY:  Diabetes mellitus  History of back surgery: with hard ware  H/O carpal tunnel repair: right  H/O foot surgery: with hard ware  H/O total knee replacement, bilateral      FAMILY HISTORY:  No pertinent family history in first degree relatives      SOCIAL HISTORY  Smoking History: Active    REVIEW OF SYSTEMS:    CONSTITUTIONAL:  As per HPI.    HEENT:  Eyes:  No diplopia or blurred vision. ENT:  No earache, sore throat or runny nose.    CARDIOVASCULAR:  No pressure, squeezing, tightness, heaviness or aching about the chest; no palpitations.    RESPIRATORY: Per HPI    GASTROINTESTINAL:  No nausea, vomiting or diarrhea.    GENITOURINARY:  No dysuria, frequency or urgency.    MUSCULOSKELETAL:  No joint pains    SKIN:  No new lesions.    NEUROLOGIC:  No paresthesias, fasciculations, seizures or weakness.    PSYCHIATRIC:  No disorder of thought or mood.    ENDOCRINE:  No heat or cold intolerance, polyuria or polydipsia.    HEMATOLOGICAL:  No easy bruising or bleeding.     Vital Signs Last 24 Hrs  T(C): 36.8 (18 Oct 2018 02:35), Max: 37 (17 Oct 2018 18:50)  T(F): 98.3 (18 Oct 2018 02:35), Max: 98.6 (17 Oct 2018 18:50)  HR: 73 (18 Oct 2018 09:03) (18 - 97)  BP: 168/70 (18 Oct 2018 09:03) (140/95 - 195/77)  BP(mean): 110 (17 Oct 2018 18:50) (110 - 110)  RR: 18 (18 Oct 2018 09:03) (18 - 26)  SpO2: 93% (18 Oct 2018 09:03) (93% - 97%)    PHYSICAL EXAMINATION:    GENERAL: The patient is a well-developed, well-nourished _____in no apparent distress.     HEENT: Head is normocephalic and atraumatic. Extraocular muscles are intact. Mucous membranes are moist.     NECK:Supraclavicular density above the left clavicle, hard, fixed.      LUNGS: Clear to auscultation without wheezing, rales, or rhonchi. Respirations unlabored    HEART: Regular rate and rhythm without murmur.    ABDOMEN: Soft, nontender, and nondistended.  No hepatosplenomegaly is noted.    EXTREMITIES: Without any cyanosis, clubbing, rash, lesions or edema.    NEUROLOGIC: Grossly intact.    SKIN: No ulceration or induration present.      LABS:                        13.1   7.8   )-----------( 184      ( 17 Oct 2018 16:28 )             40.2     10-17    133<L>  |  97<L>  |  11.0  ----------------------------<  103  4.5   |  23.0  |  0.63    Ca    9.8      17 Oct 2018 16:28    TPro  7.5  /  Alb  4.5  /  TBili  0.4  /  DBili  x   /  AST  17  /  ALT  11  /  AlkPhos  66  10-17    PT/INR - ( 17 Oct 2018 16:28 )   PT: 12.8 sec;   INR: 1.16 ratio         PTT - ( 17 Oct 2018 16:28 )  PTT:38.8 sec            Serum Pro-Brain Natriuretic Peptide: 459 pg/mL (10-17-18 @ 16:28)          MICROBIOLOGY:    RADIOLOGY & ADDITIONAL STUDIES:< from: Xray Chest 1 View AP/PA. (10.17.18 @ 17:14) >   EXAM:  XR CHEST AP OR PA 1V                          PROCEDURE DATE:  10/17/2018          INTERPRETATION:  CHEST AP PORTABLE:    History: Cough.     Date and time of exam: 10/17/2018 4:26 PM.    Technique: A single AP view of the chest was obtained.    Comparison exam: No prior exam.    Findings:  There is a right suprahilar parenchymal density in the infiltrate or   mass. The left lung is clear. The heart is not enlarged. No evidence of   mediastinal widening. No evidence of pleural effusion..    Impression:  Right suprahilar parenchymal density, infiltrate versus mass. Suggest   chest CT with IV contrast for further evaluation..                RENARD PATEL M.D., ATTENDING RADIOLOGIST  This document has been electronically signed. Oct 17 2018  8:51PM        < end of copied text >

## 2018-10-18 NOTE — PROGRESS NOTE ADULT - ASSESSMENT
73 y.o. female with PMHx of DMII, HTN, Chronic pain on pain meds by Dr. Lowry, DPNP, OA s/p BL TKAs and lung masses s/p attempted biopsy due to cough spell.    1. Lung masses - likely metastatic lung CA, doubt PNA, needs repeat CT chest 73 y.o. female with PMHx of DMII, HTN, Chronic pain on pain meds by Dr. Lowry, DPNP, OA s/p BL TKAs and lung masses s/p attempted biopsy due to cough spell.    1. Lung masses - likely metastatic lung CA, doubt PNA, needs repeat CT chest with contrast to evaluate lymphadenopathy.   - procalcitonin to r/o infection (possible postobtructive)    2. DMII- HISS, well controlled on outpt regimen with HgbA1C 6.6    3. DPNP - will need to start gabapentin    4. HTN - poorly controlled   - monitor BP, titrate medications to effect    5. BL LE edema L>R - r/o DVT, likely PVD    6. Left great toe ulceration with discharge - Xrays, podiatry eval    7. VTE proph - UFH    8. Chronic pain - resume pt's own norco and cyclobenzaprine

## 2018-10-18 NOTE — BEHAVIORAL HEALTH ASSESSMENT NOTE - NSBHCHARTREVIEWIMAGING_PSY_A_CORE FT
< from: Xray Chest 1 View AP/PA. (10.17.18 @ 17:14) >    Impression:  Right suprahilar parenchymal density, infiltrate versus mass. Suggest   chest CT with IV contrast for further evaluation..     < end of copied text >

## 2018-10-18 NOTE — BEHAVIORAL HEALTH ASSESSMENT NOTE - NSBHCHARTREVIEWVS_PSY_A_CORE FT
Vital Signs Last 24 Hrs  T(C): 36.8 (18 Oct 2018 02:35), Max: 37 (17 Oct 2018 18:50)  T(F): 98.3 (18 Oct 2018 02:35), Max: 98.6 (17 Oct 2018 18:50)  HR: 73 (18 Oct 2018 09:03) (18 - 97)  BP: 168/70 (18 Oct 2018 09:03) (140/95 - 195/77)  BP(mean): 110 (17 Oct 2018 18:50) (110 - 110)  RR: 18 (18 Oct 2018 09:03) (18 - 26)  SpO2: 93% (18 Oct 2018 09:03) (93% - 97%)

## 2018-11-11 PROCEDURE — 85610 PROTHROMBIN TIME: CPT

## 2018-11-11 PROCEDURE — 83880 ASSAY OF NATRIURETIC PEPTIDE: CPT

## 2018-11-11 PROCEDURE — 87486 CHLMYD PNEUM DNA AMP PROBE: CPT

## 2018-11-11 PROCEDURE — 93970 EXTREMITY STUDY: CPT

## 2018-11-11 PROCEDURE — 87633 RESP VIRUS 12-25 TARGETS: CPT

## 2018-11-11 PROCEDURE — 80053 COMPREHEN METABOLIC PANEL: CPT

## 2018-11-11 PROCEDURE — 96365 THER/PROPH/DIAG IV INF INIT: CPT

## 2018-11-11 PROCEDURE — 85027 COMPLETE CBC AUTOMATED: CPT

## 2018-11-11 PROCEDURE — 93005 ELECTROCARDIOGRAM TRACING: CPT

## 2018-11-11 PROCEDURE — 85730 THROMBOPLASTIN TIME PARTIAL: CPT

## 2018-11-11 PROCEDURE — 82962 GLUCOSE BLOOD TEST: CPT

## 2018-11-11 PROCEDURE — 94640 AIRWAY INHALATION TREATMENT: CPT

## 2018-11-11 PROCEDURE — 87581 M.PNEUMON DNA AMP PROBE: CPT

## 2018-11-11 PROCEDURE — 71045 X-RAY EXAM CHEST 1 VIEW: CPT

## 2018-11-11 PROCEDURE — 99285 EMERGENCY DEPT VISIT HI MDM: CPT | Mod: 25

## 2018-11-11 PROCEDURE — 83036 HEMOGLOBIN GLYCOSYLATED A1C: CPT

## 2018-11-11 PROCEDURE — 36415 COLL VENOUS BLD VENIPUNCTURE: CPT

## 2018-11-11 PROCEDURE — 73630 X-RAY EXAM OF FOOT: CPT

## 2018-11-11 PROCEDURE — 96375 TX/PRO/DX INJ NEW DRUG ADDON: CPT

## 2018-11-11 PROCEDURE — 84145 PROCALCITONIN (PCT): CPT

## 2018-11-11 PROCEDURE — 71260 CT THORAX DX C+: CPT

## 2018-11-11 PROCEDURE — 87798 DETECT AGENT NOS DNA AMP: CPT

## 2019-06-07 NOTE — H&P PST ADULT - BP NONINVASIVE SYSTOLIC (MM HG)
Cardiology Note    Eliot Garcia MD          Jossue Freire is a 69 y.o. male 1949     He returns for follow-up of lab work after beginning antiplatelet and statin therapy  He has nonobstructive CAD based on coronary CT angiogram February 2019 with a score of 38 and 30% lesions  EKG is abnormal with fluctuating inferior T wave inversions  His baseline LDL cholesterol was 90  On Crestor it is now 52, perfect                 Patient Active Problem List    Diagnosis Date Noted   • Mixed hyperlipidemia 06/07/2019   • Coronary artery disease involving native coronary artery of native heart without angina pectoris 03/01/2019   • Fatty liver 02/12/2019   • Family history of early CAD 02/12/2019   • Essential hypertension 02/11/2019   • Other chest pain 02/11/2019       Allergy  Patient has no known allergies.    MED LIST     Current Outpatient Prescriptions   Medication Sig Dispense Refill   • amLODIPine (NORVASC) 2.5 mg tablet Take 2.5 mg by mouth daily.     • aspirin (ASPIR-81) 81 mg enteric coated tablet Take 1 tablet (81 mg total) by mouth daily. 90 tablet 1   • diazePAM (VALIUM) 2 mg tablet Take 2 mg by mouth nightly.     • finasteride (PROSCAR) 5 mg tablet Take 5 mg by mouth once daily.  4   • folic acid/multivit-min/lutein (CENTRUM SILVER ORAL) Take by mouth.     • LORazepam (ATIVAN) 0.5 mg tablet Take 0.5 mg by mouth as needed.    0   • magnesium 250 mg tablet Take 250 mg by mouth daily. Patient takes 2  Tablets daily     • naproxen sodium (ANAPROX) 550 mg tablet Take 550 mg by mouth as needed.    6   • omeprazole (PriLOSEC) 40 mg capsule Take 40 mg by mouth once daily.  3   • rosuvastatin (CRESTOR) 20 mg tablet Take 1 tablet (20 mg total) by mouth daily. 90 tablet 1   • tamsulosin (FLOMAX) 0.4 mg capsule Take 0.8 mg by mouth nightly.  3     No current facility-administered medications for this visit.         Review of Systems   Constitution: Negative for malaise/fatigue, weight gain and weight  "loss.   HENT: Negative for hearing loss and hoarse voice.    Eyes: Negative for visual disturbance.   Cardiovascular: Negative for chest pain, claudication, cyanosis, dyspnea on exertion, irregular heartbeat, leg swelling, near-syncope, orthopnea, palpitations, paroxysmal nocturnal dyspnea and syncope.   Respiratory: Negative for cough, hemoptysis, shortness of breath, sleep disturbances due to breathing, snoring, sputum production and wheezing.    Endocrine: Negative for cold intolerance and heat intolerance.   Hematologic/Lymphatic: Negative.  Negative for bleeding problem. Does not bruise/bleed easily.   Skin: Negative.  Negative for rash.   Musculoskeletal: Negative for arthritis, falls, joint pain, muscle cramps and myalgias.   Gastrointestinal: Negative for abdominal pain, anorexia, change in bowel habit, constipation, diarrhea, dysphagia, heartburn, jaundice and nausea.   Genitourinary: Negative for frequency and nocturia.   Neurological: Negative for dizziness, focal weakness, headaches, light-headedness, numbness, tremors and vertigo.   Psychiatric/Behavioral: Negative for memory loss. The patient does not have insomnia and is not nervous/anxious.    Allergic/Immunologic: Negative for hives.       Labs       June 2019          No results found for: WBC, HGB, HCT, PLT, CHOL, TRIG, HDL, LDLDIRECT, TOTLDLC, LDLCALC, ALT, AST, NA, K, CL, CREATININE, BUN, CO2, TSH, INR, HGBA1C, BNP    No results found for: GLUCOSE, CALCIUM, NA, K, CO2, CL, BUN, CREATININE    Objective   Vitals:    06/11/19 0753   BP: 126/70   Pulse: 77   SpO2: 97%   Weight: 72.6 kg (160 lb)   Height: 1.702 m (5' 7\")     Physical Exam   Constitutional: He is oriented to person, place, and time. He appears well-developed and well-nourished. He is active.  Non-toxic appearance. He does not have a sickly appearance. He does not appear ill. No distress. He is not intubated.   HENT:   Head: Normocephalic. Not microcephalic. Head is without raccoon's " eyes, without abrasion and without contusion.   Nose: Nose normal.   Eyes: EOM are normal. Left eye exhibits no discharge and no exudate. Right conjunctiva is not injected. Left conjunctiva is not injected. Left conjunctiva has no hemorrhage. No scleral icterus. Pupils are equal.   Neck: Normal range of motion. Neck supple. Normal carotid pulses and no hepatojugular reflux present. Carotid bruit is not present.   Cardiovascular: Normal rate, regular rhythm, normal heart sounds, intact distal pulses and normal pulses.  PMI is not displaced.  Exam reveals no gallop and no friction rub.    No murmur heard.  Pulmonary/Chest: Effort normal and breath sounds normal. No stridor. No apnea, no tachypnea and no bradypnea. He is not intubated. No respiratory distress. He has no wheezes. He has no rales. He exhibits no tenderness.   Abdominal: Soft. Normal appearance, normal aorta and bowel sounds are normal. He exhibits no distension. There is no tenderness.   Musculoskeletal: Normal range of motion. He exhibits no edema or deformity.   Neurological: He is alert and oriented to person, place, and time.   Skin: No abrasion, no bruising, no ecchymosis and no rash noted. He is not diaphoretic. No erythema. No pallor.   Psychiatric: He has a normal mood and affect. His mood appears not anxious. His affect is not angry, not blunt, not labile and not inappropriate. His speech is not slurred. He is not agitated, not aggressive, not withdrawn and not combative. He does not exhibit a depressed mood. He is communicative.       Cardiac Procedures    STRESS TEST  Stress echo neg 2/19    CAT SCAN   cta feb 2019COR CTA SCORE 38 LAD [id number]% MID MID RCA 30%   FATTY LIVER     SUMMARY:COR CTA 2/19  1. Nonobstructive two-vessel coronary artery disease. There is focal calcified  plaque involving the proximal and mid LAD with less than 30% stenosis. There is  focal calcified plaque in the mid RCA with less than 30% stenosis.  2.  Normal cardiac  structures.  3.  Normal left ventricular systolic function. Left ventricular ejection  fraction 50%.  4.  Small hiatal hernia.  5.  Coronary calcium score 38.  This places the patient in the GREEN 80th risk  percentile for age and gender matched individuals  EKG    Assessment/Plan:  Coronary artery disease involving native coronary artery of native heart without angina pectoris  Nonobstructive CAD diagnosed by coronary calcium score and coronary CT angiogram done in February 2019 also negative stress echo at that time on preventive therapy with aspirin and statin lab work looks great repeat ischemic evaluation to 3 years    Mixed hyperlipidemia  Great response to Crestor LDL 52    Essential hypertension  Blood pressure controlled on amlodipine           I will see him back in 1 year with lab ischemic evaluation in 2 to 3 years        Thank you for allowing me to participate in the care of this patient.  I hope this information is helpful.    Liam Mckeon MD [facility]   6/11/2019  Eliot Wilkins MD   182

## 2020-01-30 NOTE — H&P ADULT - HISTORY OF PRESENT ILLNESS
73 years old female with PMH of HTN, DM and chronic back pain came to ER fro the evaluation of cough and SOB s/p an attempt to biopsy L neck mass by IR. Reportedly patient has been having 2 months h/o cough worsening over the past several days.  She had a chest CT last month which was notable for areas of opacity suspicious for lung CA. She was also noted to have  a left neck lump around . She was seen by CT chest and scheduled for L neck mass biopsy today, Procedure  was aborted due to the patient's excessive bouts of coughing.  Positive history of smoking (1 pack a day) and a recent unintentional weight loss of about 26lbs over the last 2 months.  Patient also reports headache, SOB and a sensation of "something stuck in my throat". None

## 2020-06-23 NOTE — ED PROVIDER NOTE - CPE EDP GASTRO NORM
I have personally performed patient's history, physical exam, clinical impressions and treatment plan and the services as described by Corinne Brown PA-C    The patient and granddaughter really are extremely pleased with how well of 2D has done.  She still needs to do some work on quad strengthening to maximize her ability but they are very pleased at this time.  Her pain is gone.  All their questions were answered.  See her back on a p.r.n. basis.     normal...

## 2020-11-16 NOTE — ED ADULT NURSE NOTE - NEURO WDL
Alert and oriented to person, place and time, memory intact, behavior appropriate to situation, PERRL.
16-Nov-2020 11:09

## 2022-02-07 NOTE — H&P PST ADULT - ANESTHESIA, PREVIOUS REACTION, PROFILE
Critical Care Progress Note         Patient Karie Fishman   MRN -  [id number]   Acct # - [de-identified]   - 1934      Date of Admission -  2022  3:00 PM  Date of evaluation -  2022  [address] Day - 1            ADMIT/CONSULT DETAILS     Reason for Admit/Consult   AMS   Unresponsiveness  COVID 10   Acute respiratory failure. Ventilator management. Galileo Saleem MD  Primary Care Physician - Anitha Goncalves MD         Subjective    Seizures x 2 overnight requiring Ativan. Remains unresponsive. Babinski flare up  VSS No pressor requirements  Afebrile  Remains intubated. Past Medical History         Diagnosis Date    Arthritis     Atrial fibrillation ([facility] [address]     Hyperlipidemia     Hypertension     Non-pressure chronic ulcer of other part of right lower leg with fat layer exposed ([facility] [address] 2020    Thyroid disease         Past Surgical History           Procedure Laterality Date    PACEMAKER INSERTION  2019    PPM GENT CHANGE  ([facility])   DR. TONG     Current Medications   Current Medications    levetiracetam  500 mg IntraVENous Q12H    metoprolol tartrate  25 mg Oral BID    sodium chloride flush  5-40 mL IntraVENous 2 times per day    [Held by provider] famotidine  20 mg Oral Daily    [Held by provider] levETIRAcetam  500 mg Oral BID    [Held by provider] insulin lispro  0-3 Units SubCUTAneous Nightly    albumin human  50 g IntraVENous Q6H    piperacillin-tazobactam  3,375 mg IntraVENous Q8H    dexamethasone  10 mg IntraVENous BID    pantoprazole  40 mg IntraVENous Daily    And    sodium chloride (PF)  10 mL IntraVENous Daily    insulin lispro  0-6 Units SubCUTAneous Q4H     LORazepam, perflutren lipid microspheres, sodium chloride flush, sodium chloride, acetaminophen **OR** acetaminophen, glucose, dextrose, glucagon (rDNA), dextrose  IV Drips/Infusions   sodium chloride      sodium chloride 50 mL/hr at 22 1204    dextrose       Home Medications  Medications Prior to Admission: benzonatate (TESSALON) 200 MG capsule, Take 200 mg by mouth three times daily  dilTIAZem (CARDIZEM CD) 120 MG extended release capsule, Take 120 mg by mouth daily  docusate sodium (COLACE) 100 MG capsule, Take 100 mg by mouth daily  metOLazone (ZAROXOLYN) 2.5 MG tablet, Take 2.5 mg by mouth every other day  Multiple Vitamins-Minerals (THERAPEUTIC MULTIVITAMIN-MINERALS) tablet, Take 1 tablet by mouth daily  warfarin (COUMADIN) 4 MG tablet, Take 1 tablet by mouth daily  bumetanide (BUMEX) 1 MG tablet, Take 1 tablet by mouth 2 times daily  spironolactone (ALDACTONE) 25 MG tablet, Take 1 tablet by mouth daily  [] polyethylene glycol (GLYCOLAX) 17 g packet, Take 17 g by mouth daily as needed for Constipation  metoprolol (LOPRESSOR) 100 MG tablet, Take 200 mg by mouth 2 times daily   simvastatin (ZOCOR) 20 MG tablet, Take 20 mg by mouth nightly  levothyroxine (SYNTHROID) 125 MCG tablet, Take 125 mcg by mouth Daily     Diet/Nutrition   Diet NPO    Allergies   Patient has no known allergies. Social History   Tobacco   reports that she has never smoked. She has never used smokeless tobacco.    Alcohol     reports no history of alcohol use. Occupational history :    Family History   No family history on file. ROS     REVIEW OF SYSTEMS:  Review of systems not obtained due to patient factors - intubation    Lines and Devices    22  Right Femoral TLC    Right radial arterial line   22 Urethral catheter.    22  OGT  22  ETT # 7.5      Mechanical Ventilation Data   VENT SETTINGS (Comprehensive)  Vent Information  $Ventilation: $Subsequent Day  Skin Assessment: Clean, dry, & intact  Equipment ID: [id number]  Vent Type: [id number]  Vent Mode: (S) PS  Vt Ordered: (S) 0 mL  Rate Set: (S) 0 bmp  Peak Flow: 0 L/min  Pressure Support: (S) 12 cmH20  FiO2 : 30 %  SpO2: 94 %  SpO2/FiO2 ratio: 313.33  Sensitivity: 3  PEEP/CPAP: 5  I Time/ I Time %: 0 s  Humidification Source: Heated wire  Humidification Temp: 37  Humidification Temp Measured: 37  Additional Respiratory  Assessments  Pulse: 83  Resp: (!) 34  SpO2: 94 %  Position: Semi-Pineda's  Humidification Source: Heated wire  Humidification Temp: 37  Oral Care: Mouthwash with chlorhexidine,Mouth swabbed,Suction toothette  Subglottic Suction Done?: Yes  Cuff Pressure (cm H2O): 29 cm H2O    ABG  Lab Results   Component Value Date    PH 7.481 2022    PCO2 22.3 2022    PO2 102.6 2022    HCO3 16.3 2022    O2SAT 97.7 2022     Lab Results   Component Value Date    MODE AC 2022           Vitals    height is 5' (1.524 m) and weight is 233 lb 6.4 oz (105.9 kg). Her esophageal temperature is 98.6 °F (37 °C). Her blood pressure is 124/52 (abnormal) and her pulse is 83. Her respiration is 34 (abnormal) and oxygen saturation is 94%.        Temperature Range: Temp: 98.6 °F (37 °C) Temp  Av.8 °F (37.1 °C)  Min: 98.2 °F (36.8 °C)  Max: 99.5 °F (37.5 °C)  BP Range:  Systolic (45ITY), LGO:246 , Min:120 , RHM:590     Diastolic (03ZRD), EXB:81, Min:47, Max:56    Pulse Range: Pulse  Av.1  Min: 70  Max: 116  Respiration Range: Resp  Av.5  Min: 14  Max: 34  Current Pulse Ox[de-identified]  SpO2: 94 %  24HR Pulse Ox Range:  SpO2  Av.9 %  Min: 79 %  Max: 100 %  Oxygen Amount and Delivery:        I/O (24 Hours)    Patient Vitals for the past 8 hrs:   BP Pulse Resp SpO2   22 1350  83 (!) 34 94 %   22 1300  80 17 95 %   22 1200  70 16 95 %   22 1154  71 18 95 %   22 1100  78 19 95 %   22 1000  70 18 94 %   22 0940 (!) 124/52 72     22 0900  74 19 97 %   22 0831  74 19 97 %   22 0800  79 25 97 %   22 07  73 20 97 %       Intake/Output Summary (Last 24 hours) at 2022 1408  Last data filed at 2022 0941  Gross per 24 hour   Intake 2101 ml   Output 934 ml   Net 1167 ml     I/O last 3 02/07/2022    PROMYELOPCT 0.9 02/06/2022    MONOPCT 2.7 02/07/2022    MYELOPCT 3.5 02/06/2022    BASOPCT 0.3 02/07/2022    MONOSABS 0.35 02/07/2022    LYMPHSABS 0.78 02/07/2022    EOSABS 0.00 02/07/2022    BASOSABS 0.04 02/07/2022       BMP   Lab Results   Component Value Date     02/07/2022    K 4.4 02/07/2022    K 3.7 02/06/2022    CL 99 02/07/2022    CO2 15 02/07/2022    BUN 50 02/07/2022    CREATININE 1.1 02/07/2022    GLUCOSE 224 02/07/2022    CALCIUM 8.4 02/07/2022       LFTS  Lab Results   Component Value Date    ALKPHOS 112 02/06/2022    ALT 13 02/06/2022    AST 34 02/06/2022    PROT 6.4 02/06/2022    BILITOT 1.4 02/06/2022    LABALBU 1.6 02/06/2022       INR  Recent Labs     02/06/22  0545 02/06/22  1612 02/07/22  0549   PROTIME 18.2* 22.6* 29.9*   INR 1.6 2.1 2.7       APTT  Recent Labs     02/06/22 1612   APTT 40.4*       Lactic Acid  Lab Results   Component Value Date    LACTA 2.1 02/06/2022    LACTA 2.0 02/06/2022    LACTA 2.1 02/06/2022        BNP   No results for input(s): BNP in the last 72 hours. Cultures     Recent Labs     02/05/22  1400   BC 24 Hours no growth     No results for input(s): BLOODCULT2 in the last 72 hours. No results for input(s): LABURIN in the last 72 hours. Radiology   CXR        CT Scans  Head CT 02/05/22    No acute intracranial hemorrhage mass effect or midline shift. No evidence of hydrocephalus. SYSTEMS ASSESSMENT    Neuro   Acute encephalopathy unknown etiology metabolic or anoxic. Seizure disorder  Questionable cardio embolic  Stroke  Neurology consulted appreciate recommendations. Continue Keppra IV for seizure prophylaxis. Ativan prn for seizures  Seizure precautions. Unable to obtain MRI due to pacemaker   CT head no intracranial hemorrhage. Respiratory   Acute hypoxic respiratory failure secondary to COVID 19 pneumonia.    COVID + 1/25 received no treatment  Decadron IV  Duo Nebs as needed   Enhanced droplet precautions  Chest x ray in AM   Daily ABG while intubated. Plan for SBT today. Will not extubate due to neurological status and inability to protect airway. Wean oxygen as tolerated. Keep O2 sat 90-92%    Cardiovascular   Acute on chronic CHF   Cardiomegaly   Will need Echo with bubble study. Metoprolol 25 ,g BID  Rate controlled for A fib. Maintain MAP > 65   Continuous telemetry. ProBNP  14,148 repeat in AM   Cardiology consulted for Afib with RVR  Holding Coumadin as per Cardiology note   Goal INR 2 to 3   LV dysfunction EF 40 to 45%/       Gastrointestinal   NPO  Protonix daily  Tube feedings     Renal   Acute on chronic kidney injury   Avoid nephrotoxic medications. Strict I and O. Renal dose medications for GFR. Baseline creatine 1.2 to 1.3  Continue IVF gentle hydration. Nephrology consulted Dr. Daljit Luna        Infectious Disease   Sepsis   Infectious disease consulted appreciated recommendations. Gram + cocci beta strep in blood   Improving WBC   Continue antibiotics as per Id  Continue Zosyn. Blood cultures repeated. Procal 4.55 2/06/22 Repeat in AM       Hematology/Oncology   Supratherapetic INR received reversal agent  Coumadin on hold      Endocrine   No record of diabetes. Hgb A1C 7.0   Low dose insulin coverage. Every 4 hours. Hypoglycemic protocol. Hyponatremia chronic  133    Social/Spiritual/DNR/Other   DNR CCA  Palliative care consulted. Plan of care discussed in multidisciplinary rounds. Dr. Alfredo Jones is the collaborating physician. Joanne Waddell, ESTELLE   Medical Intensive Care Unit. none

## 2022-04-16 NOTE — ED ADULT NURSE NOTE - OBJECTIVE STATEMENT
The following labs labeled with pt sticker and tubed to lab:     [] Blue     [] Lavender   [] on ice  [x] Green/yellow  [] Green/black [] on ice  [] Yellow  [] Red  [] Pink      [] COVID-19 swab    [] Rapid  [] PCR  [] Flu swab  [] Peds Viral Panel     [] Urine Sample  [] Pelvic Cultures  [] Blood Cultures            Ascencion Park RN  04/15/22 [id number] 73 year old female, alert and oriented x 4, c/o cough.

## 2023-11-30 NOTE — H&P PST ADULT - EXTREMITIES
Problem: Pain  Goal: Takes deep breaths with improved pain control throughout the shift  Outcome: Progressing  Goal: Turns in bed with improved pain control throughout the shift  Outcome: Progressing  Goal: Walks with improved pain control throughout the shift  Outcome: Progressing  Goal: Performs ADL's with improved pain control throughout shift  Outcome: Progressing  Goal: Participates in PT with improved pain control throughout the shift  Outcome: Progressing  Goal: Free from opioid side effects throughout the shift  Outcome: Progressing  Goal: Free from acute confusion related to pain meds throughout the shift  Outcome: Progressing     Problem: Pain  Goal: My pain/discomfort is manageable  Outcome: Progressing     Problem: Safety  Goal: Patient will be injury free during hospitalization  Outcome: Progressing  Goal: I will remain free of falls  Outcome: Progressing     Problem: Daily Care  Goal: Daily care needs are met  Outcome: Progressing     Problem: Psychosocial Needs  Goal: Demonstrates ability to cope with hospitalization/illness  Outcome: Progressing  Goal: Collaborate with me, my family, and caregiver to identify my specific goals  Outcome: Progressing     Problem: Discharge Barriers  Goal: My discharge needs are met  Outcome: Progressing           The patient's goals for the shift include  Pain management     The clinical goals for the shift include patient will rate pain <6/10 this shift    Over the shift, the patient did make progress toward the following goals.    detailed exam